# Patient Record
Sex: FEMALE | Race: WHITE | NOT HISPANIC OR LATINO
[De-identification: names, ages, dates, MRNs, and addresses within clinical notes are randomized per-mention and may not be internally consistent; named-entity substitution may affect disease eponyms.]

---

## 2017-08-16 ENCOUNTER — APPOINTMENT (OUTPATIENT)
Dept: SURGICAL ONCOLOGY | Facility: CLINIC | Age: 82
End: 2017-08-16

## 2017-11-08 ENCOUNTER — APPOINTMENT (OUTPATIENT)
Dept: SURGICAL ONCOLOGY | Facility: CLINIC | Age: 82
End: 2017-11-08

## 2019-02-05 ENCOUNTER — INPATIENT (INPATIENT)
Facility: HOSPITAL | Age: 84
LOS: 3 days | Discharge: NURSING, REHAB OR LONG-TERM CARE | End: 2019-02-09
Attending: STUDENT IN AN ORGANIZED HEALTH CARE EDUCATION/TRAINING PROGRAM | Admitting: STUDENT IN AN ORGANIZED HEALTH CARE EDUCATION/TRAINING PROGRAM
Payer: MEDICARE

## 2019-02-05 VITALS
DIASTOLIC BLOOD PRESSURE: 59 MMHG | HEART RATE: 60 BPM | TEMPERATURE: 98 F | OXYGEN SATURATION: 100 % | SYSTOLIC BLOOD PRESSURE: 165 MMHG | RESPIRATION RATE: 17 BRPM

## 2019-02-05 LAB
ALBUMIN SERPL ELPH-MCNC: 4.4 G/DL — SIGNIFICANT CHANGE UP (ref 3.3–5)
ALP SERPL-CCNC: 97 U/L — SIGNIFICANT CHANGE UP (ref 40–120)
ALT FLD-CCNC: 19 U/L — SIGNIFICANT CHANGE UP (ref 4–33)
ANION GAP SERPL CALC-SCNC: 13 MMO/L — SIGNIFICANT CHANGE UP (ref 7–14)
APTT BLD: 31 SEC — SIGNIFICANT CHANGE UP (ref 27.5–36.3)
AST SERPL-CCNC: 33 U/L — HIGH (ref 4–32)
BASOPHILS # BLD AUTO: 0.04 K/UL — SIGNIFICANT CHANGE UP (ref 0–0.2)
BASOPHILS NFR BLD AUTO: 0.3 % — SIGNIFICANT CHANGE UP (ref 0–2)
BILIRUB SERPL-MCNC: 0.5 MG/DL — SIGNIFICANT CHANGE UP (ref 0.2–1.2)
BUN SERPL-MCNC: 12 MG/DL — SIGNIFICANT CHANGE UP (ref 7–23)
CALCIUM SERPL-MCNC: 10 MG/DL — SIGNIFICANT CHANGE UP (ref 8.4–10.5)
CHLORIDE SERPL-SCNC: 104 MMOL/L — SIGNIFICANT CHANGE UP (ref 98–107)
CO2 SERPL-SCNC: 25 MMOL/L — SIGNIFICANT CHANGE UP (ref 22–31)
CREAT SERPL-MCNC: 0.65 MG/DL — SIGNIFICANT CHANGE UP (ref 0.5–1.3)
EOSINOPHIL # BLD AUTO: 0.01 K/UL — SIGNIFICANT CHANGE UP (ref 0–0.5)
EOSINOPHIL NFR BLD AUTO: 0.1 % — SIGNIFICANT CHANGE UP (ref 0–6)
GLUCOSE SERPL-MCNC: 111 MG/DL — HIGH (ref 70–99)
HCT VFR BLD CALC: 45.6 % — HIGH (ref 34.5–45)
HGB BLD-MCNC: 14.3 G/DL — SIGNIFICANT CHANGE UP (ref 11.5–15.5)
IMM GRANULOCYTES NFR BLD AUTO: 0.4 % — SIGNIFICANT CHANGE UP (ref 0–1.5)
INR BLD: 1.04 — SIGNIFICANT CHANGE UP (ref 0.88–1.17)
LYMPHOCYTES # BLD AUTO: 0.88 K/UL — LOW (ref 1–3.3)
LYMPHOCYTES # BLD AUTO: 6.3 % — LOW (ref 13–44)
MCHC RBC-ENTMCNC: 28.8 PG — SIGNIFICANT CHANGE UP (ref 27–34)
MCHC RBC-ENTMCNC: 31.4 % — LOW (ref 32–36)
MCV RBC AUTO: 91.8 FL — SIGNIFICANT CHANGE UP (ref 80–100)
MONOCYTES # BLD AUTO: 0.85 K/UL — SIGNIFICANT CHANGE UP (ref 0–0.9)
MONOCYTES NFR BLD AUTO: 6.1 % — SIGNIFICANT CHANGE UP (ref 2–14)
NEUTROPHILS # BLD AUTO: 12.1 K/UL — HIGH (ref 1.8–7.4)
NEUTROPHILS NFR BLD AUTO: 86.8 % — HIGH (ref 43–77)
NRBC # FLD: 0 K/UL — LOW (ref 25–125)
PLATELET # BLD AUTO: 292 K/UL — SIGNIFICANT CHANGE UP (ref 150–400)
PMV BLD: 10.2 FL — SIGNIFICANT CHANGE UP (ref 7–13)
POTASSIUM SERPL-MCNC: 4 MMOL/L — SIGNIFICANT CHANGE UP (ref 3.5–5.3)
POTASSIUM SERPL-SCNC: 4 MMOL/L — SIGNIFICANT CHANGE UP (ref 3.5–5.3)
PROT SERPL-MCNC: 7.3 G/DL — SIGNIFICANT CHANGE UP (ref 6–8.3)
PROTHROM AB SERPL-ACNC: 11.9 SEC — SIGNIFICANT CHANGE UP (ref 9.8–13.1)
RBC # BLD: 4.97 M/UL — SIGNIFICANT CHANGE UP (ref 3.8–5.2)
RBC # FLD: 13.2 % — SIGNIFICANT CHANGE UP (ref 10.3–14.5)
SODIUM SERPL-SCNC: 142 MMOL/L — SIGNIFICANT CHANGE UP (ref 135–145)
WBC # BLD: 13.93 K/UL — HIGH (ref 3.8–10.5)
WBC # FLD AUTO: 13.93 K/UL — HIGH (ref 3.8–10.5)

## 2019-02-05 PROCEDURE — 73502 X-RAY EXAM HIP UNI 2-3 VIEWS: CPT | Mod: 26,LT

## 2019-02-05 PROCEDURE — 71045 X-RAY EXAM CHEST 1 VIEW: CPT | Mod: 26

## 2019-02-05 PROCEDURE — 76376 3D RENDER W/INTRP POSTPROCES: CPT | Mod: 26

## 2019-02-05 PROCEDURE — 72192 CT PELVIS W/O DYE: CPT | Mod: 26

## 2019-02-05 NOTE — ED PROVIDER NOTE - CLINICAL SUMMARY MEDICAL DECISION MAKING FREE TEXT BOX
PGY1/MD Linda. 89 yo F with no PMH, s/p fall, p/w left hip pain. No abrasion, no need of tetanus shot. plan for hip xray, if negative to fracture, pt may bear her weight.

## 2019-02-05 NOTE — ED ADULT TRIAGE NOTE - CHIEF COMPLAINT QUOTE
pt fell after her foot cart got stuck going into trunk. complains of Left sided hip pain. no obvious deformity noted.

## 2019-02-05 NOTE — ED PROVIDER NOTE - MEDICAL DECISION MAKING DETAILS
PGY1/MD Linda. 88 yo F with no PMH, s/p fall, p/w left hip pain. No abrasion, no need of tetanus shot. plan for hip xray, if negative to fracture, pt may bear her weight.

## 2019-02-05 NOTE — ED ADULT NURSE REASSESSMENT NOTE - NS ED NURSE REASSESS COMMENT FT1
pt noted to be hypertensive endorses hip pain, MD Mora aware will medicate as per order will continue to monitor

## 2019-02-05 NOTE — ED ADULT NURSE NOTE - NSIMPLEMENTINTERV_GEN_ALL_ED
Implemented All Fall with Harm Risk Interventions:  [place] to call system. Call bell, personal items and telephone within reach. Instruct patient to call for assistance. Room bathroom lighting operational. Non-slip footwear when patient is off stretcher. Physically safe environment: no spills, clutter or unnecessary equipment. Stretcher in lowest position, wheels locked, appropriate side rails in place. Provide visual cue, wrist band, yellow gown, etc. Monitor gait and stability. Monitor for mental status changes and reorient to person, place, and time. Review medications for side effects contributing to fall risk. Reinforce activity limits and safety measures with patient and family. Provide visual clues: red socks.

## 2019-02-05 NOTE — ED PROVIDER NOTE - ATTENDING CONTRIBUTION TO CARE
I performed a face to face bedside interview with patient regarding history of present illness, review of symptoms and past medical history. I completed an independent physical exam.  I have discussed patient's plan of care.   I agree with note as stated above, having amended the EMR as needed to reflect my findings. I have discussed the assessment and plan of care.  This includes during the time I functioned as the attending physician for this patient.  Attending Contribution to Care: agree with plan of resident. pt p/w [facility] fall, complaining of left sided pain I performed a face to face bedside interview with patient regarding history of present illness, review of symptoms and past medical history. I completed an independent physical exam.  I have discussed patient's plan of care.   I agree with note as stated above, having amended the EMR as needed to reflect my findings. I have discussed the assessment and plan of care.  This includes during the time I functioned as the attending physician for this patient.  Attending Contribution to Care: agree with plan of resident. pt p/w [facility] fall, complaining of left sided pain. unable to ambulate due to pain. xray neg for fracture. ct pos for superior and inferior pubic rami fracture. will be admitted to pain control and PT eval. unable to d/c home.

## 2019-02-05 NOTE — ED PROVIDER NOTE - PHYSICAL EXAMINATION
PGY1/MD Linda.   No e/o skull fracture, intracranial bleed, dental trauma, cervical, thoracic, or vertebral fracture or subluxation, no suspicion of thoracic, abdominal, pelvic or extremity injury by exam.    VITALS: reviewed  GEN: No apparent distress, A & O x 4, not intoxicated, GCS [id number].  HEAD/EYES: NC/AT, PERRL, EOMI, no conjunctival pallor, no CSF discharge from ear, nose. No hemotympanum, Montes signs or raccoon eyes.  ENT: mucus membranes moist, oropharynx WNL, trachea midline, no JVD, neck is supple, no distracting injury.  RESP: lungs CTA with equal breath sounds bilaterally, chest wall nontender and atraumatic  CV: heart with reg rhythm S1, S2, no murmur; distal pulses intact and symmetric bilaterally  ABDOMEN/PELVIS: normoactive bowel sounds, soft, nondistended, nontender. No tenderness on pelvinc ring, pubic symphysis.  MSK: Left hip tenderness, no limited ROM of flexion, extension, internal rotation or adduction of the hip but significant limited ROM when abduction. no edema, no asymmetry or bruise. No cervical spine midline tenderness or deformities. The back is without midline or lateral tenderness or deformity. The neck/back is ranged painlessly.  SKIN: warm, dry, no rash, no bruising, no cyanosis. color appropriate for ethnicity  NEURO: alert, mentating appropriately, no facial asymmetry. gross sensation, motor, coordination are intact  PSYCH: Affect appropriate

## 2019-02-05 NOTE — ED PROVIDER NOTE - OBJECTIVE STATEMENT
PGY1/MD Linda. 88 yo F with PMH of anxiety, PSH of mastectomy for breat cancer, cholecystectomy, s/p fall. Onset 3p, pt was at a parking garage, tripped and fell on her left hip. Pt was moving her staffs into the trunk. Pt needed help to get in the car, wait for 2 hrs in the seat to recover from the left hip pain, then called ambulance from inside the car. Took tylenol in the ambulance, which was helpful. Pain is aggravated by moving the hip but alleviated by resting. Denies head trauma. Has not tried ambulation. No abrasion or bruise. No blood thinner.

## 2019-02-05 NOTE — ED PROVIDER NOTE - PROGRESS NOTE DETAILS
PGY1/MD Linda. offered motrin for extra pain medication, pt refused to take it because she does not have pain unless moving the leg.
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
## 2019-02-06 DIAGNOSIS — Z90.49 ACQUIRED ABSENCE OF OTHER SPECIFIED PARTS OF DIGESTIVE TRACT: Chronic | ICD-10-CM

## 2019-02-06 DIAGNOSIS — Z90.12 ACQUIRED ABSENCE OF LEFT BREAST AND NIPPLE: Chronic | ICD-10-CM

## 2019-02-06 DIAGNOSIS — F41.9 ANXIETY DISORDER, UNSPECIFIED: ICD-10-CM

## 2019-02-06 DIAGNOSIS — R03.0 ELEVATED BLOOD-PRESSURE READING, WITHOUT DIAGNOSIS OF HYPERTENSION: ICD-10-CM

## 2019-02-06 DIAGNOSIS — D72.829 ELEVATED WHITE BLOOD CELL COUNT, UNSPECIFIED: ICD-10-CM

## 2019-02-06 DIAGNOSIS — Z29.9 ENCOUNTER FOR PROPHYLACTIC MEASURES, UNSPECIFIED: ICD-10-CM

## 2019-02-06 DIAGNOSIS — Z98.890 OTHER SPECIFIED POSTPROCEDURAL STATES: Chronic | ICD-10-CM

## 2019-02-06 LAB
ALBUMIN SERPL ELPH-MCNC: 4.1 G/DL — SIGNIFICANT CHANGE UP (ref 3.3–5)
ALP SERPL-CCNC: 97 U/L — SIGNIFICANT CHANGE UP (ref 40–120)
ALT FLD-CCNC: 29 U/L — SIGNIFICANT CHANGE UP (ref 4–33)
ANION GAP SERPL CALC-SCNC: 14 MMO/L — SIGNIFICANT CHANGE UP (ref 7–14)
APPEARANCE UR: CLEAR — SIGNIFICANT CHANGE UP
APPEARANCE UR: CLEAR — SIGNIFICANT CHANGE UP
AST SERPL-CCNC: 56 U/L — HIGH (ref 4–32)
BILIRUB SERPL-MCNC: 0.9 MG/DL — SIGNIFICANT CHANGE UP (ref 0.2–1.2)
BILIRUB UR-MCNC: NEGATIVE — SIGNIFICANT CHANGE UP
BILIRUB UR-MCNC: NEGATIVE — SIGNIFICANT CHANGE UP
BLOOD UR QL VISUAL: NEGATIVE — SIGNIFICANT CHANGE UP
BLOOD UR QL VISUAL: NEGATIVE — SIGNIFICANT CHANGE UP
BUN SERPL-MCNC: 10 MG/DL — SIGNIFICANT CHANGE UP (ref 7–23)
CALCIUM SERPL-MCNC: 9.3 MG/DL — SIGNIFICANT CHANGE UP (ref 8.4–10.5)
CHLORIDE SERPL-SCNC: 105 MMOL/L — SIGNIFICANT CHANGE UP (ref 98–107)
CHOLEST SERPL-MCNC: 180 MG/DL — SIGNIFICANT CHANGE UP (ref 120–199)
CO2 SERPL-SCNC: 20 MMOL/L — LOW (ref 22–31)
COLOR SPEC: SIGNIFICANT CHANGE UP
COLOR SPEC: YELLOW — SIGNIFICANT CHANGE UP
CREAT SERPL-MCNC: 0.57 MG/DL — SIGNIFICANT CHANGE UP (ref 0.5–1.3)
FERRITIN SERPL-MCNC: 212.8 NG/ML — HIGH (ref 15–150)
FOLATE SERPL-MCNC: 12.4 NG/ML — SIGNIFICANT CHANGE UP (ref 4.7–20)
GLUCOSE SERPL-MCNC: 115 MG/DL — HIGH (ref 70–99)
GLUCOSE UR-MCNC: NEGATIVE — SIGNIFICANT CHANGE UP
GLUCOSE UR-MCNC: NEGATIVE — SIGNIFICANT CHANGE UP
HAV IGM SER-ACNC: NONREACTIVE — SIGNIFICANT CHANGE UP
HBA1C BLD-MCNC: 5.5 % — SIGNIFICANT CHANGE UP (ref 4–5.6)
HBV CORE IGM SER-ACNC: NONREACTIVE — SIGNIFICANT CHANGE UP
HBV SURFACE AG SER-ACNC: NONREACTIVE — SIGNIFICANT CHANGE UP
HCV AB S/CO SERPL IA: 0.3 S/CO — SIGNIFICANT CHANGE UP
HCV AB SERPL-IMP: SIGNIFICANT CHANGE UP
HDLC SERPL-MCNC: 74 MG/DL — HIGH (ref 45–65)
IRON SATN MFR SERPL: 303 UG/DL — SIGNIFICANT CHANGE UP (ref 140–530)
IRON SATN MFR SERPL: 44 UG/DL — SIGNIFICANT CHANGE UP (ref 30–160)
KETONES UR-MCNC: NEGATIVE — SIGNIFICANT CHANGE UP
KETONES UR-MCNC: SIGNIFICANT CHANGE UP
LEUKOCYTE ESTERASE UR-ACNC: HIGH
LEUKOCYTE ESTERASE UR-ACNC: NEGATIVE — SIGNIFICANT CHANGE UP
LIPID PNL WITH DIRECT LDL SERPL: 106 MG/DL — SIGNIFICANT CHANGE UP
MAGNESIUM SERPL-MCNC: 2.5 MG/DL — SIGNIFICANT CHANGE UP (ref 1.6–2.6)
NITRITE UR-MCNC: NEGATIVE — SIGNIFICANT CHANGE UP
NITRITE UR-MCNC: NEGATIVE — SIGNIFICANT CHANGE UP
PH UR: 7 — SIGNIFICANT CHANGE UP (ref 5–8)
PH UR: 7 — SIGNIFICANT CHANGE UP (ref 5–8)
PHOSPHATE SERPL-MCNC: 2.8 MG/DL — SIGNIFICANT CHANGE UP (ref 2.5–4.5)
POTASSIUM SERPL-MCNC: 4 MMOL/L — SIGNIFICANT CHANGE UP (ref 3.5–5.3)
POTASSIUM SERPL-SCNC: 4 MMOL/L — SIGNIFICANT CHANGE UP (ref 3.5–5.3)
PROT SERPL-MCNC: 7 G/DL — SIGNIFICANT CHANGE UP (ref 6–8.3)
PROT UR-MCNC: NEGATIVE — SIGNIFICANT CHANGE UP
PROT UR-MCNC: NEGATIVE — SIGNIFICANT CHANGE UP
RBC CASTS # UR COMP ASSIST: SIGNIFICANT CHANGE UP (ref 0–?)
SODIUM SERPL-SCNC: 139 MMOL/L — SIGNIFICANT CHANGE UP (ref 135–145)
SP GR SPEC: 1.01 — SIGNIFICANT CHANGE UP (ref 1–1.04)
SP GR SPEC: 1.01 — SIGNIFICANT CHANGE UP (ref 1–1.04)
T4 FREE SERPL-MCNC: 1.29 NG/DL — SIGNIFICANT CHANGE UP (ref 0.9–1.8)
TRIGL SERPL-MCNC: 65 MG/DL — SIGNIFICANT CHANGE UP (ref 10–149)
TSH SERPL-MCNC: 1.02 UIU/ML — SIGNIFICANT CHANGE UP (ref 0.27–4.2)
UIBC SERPL-MCNC: 258.8 UG/DL — SIGNIFICANT CHANGE UP (ref 110–370)
UROBILINOGEN FLD QL: NORMAL — SIGNIFICANT CHANGE UP
UROBILINOGEN FLD QL: NORMAL — SIGNIFICANT CHANGE UP
VIT B12 SERPL-MCNC: 257 PG/ML — SIGNIFICANT CHANGE UP (ref 200–900)
WBC UR QL: SIGNIFICANT CHANGE UP (ref 0–?)

## 2019-02-06 PROCEDURE — 99223 1ST HOSP IP/OBS HIGH 75: CPT

## 2019-02-06 PROCEDURE — 27197 CLSD TX PELVIC RING FX: CPT | Mod: LT

## 2019-02-06 PROCEDURE — 99232 SBSQ HOSP IP/OBS MODERATE 35: CPT | Mod: GC,25

## 2019-02-06 RX ORDER — ASCORBIC ACID 60 MG
500 TABLET,CHEWABLE ORAL DAILY
Qty: 0 | Refills: 0 | Status: DISCONTINUED | OUTPATIENT
Start: 2019-02-06 | End: 2019-02-09

## 2019-02-06 RX ORDER — PREGABALIN 225 MG/1
1000 CAPSULE ORAL DAILY
Qty: 0 | Refills: 0 | Status: DISCONTINUED | OUTPATIENT
Start: 2019-02-06 | End: 2019-02-09

## 2019-02-06 RX ORDER — HEPARIN SODIUM 5000 [USP'U]/ML
5000 INJECTION INTRAVENOUS; SUBCUTANEOUS EVERY 12 HOURS
Qty: 0 | Refills: 0 | Status: DISCONTINUED | OUTPATIENT
Start: 2019-02-06 | End: 2019-02-09

## 2019-02-06 RX ORDER — ACETAMINOPHEN 500 MG
650 TABLET ORAL ONCE
Qty: 0 | Refills: 0 | Status: COMPLETED | OUTPATIENT
Start: 2019-02-06 | End: 2019-02-06

## 2019-02-06 RX ORDER — ACETAMINOPHEN 500 MG
650 TABLET ORAL EVERY 6 HOURS
Qty: 0 | Refills: 0 | Status: DISCONTINUED | OUTPATIENT
Start: 2019-02-06 | End: 2019-02-07

## 2019-02-06 RX ORDER — ACETAMINOPHEN 500 MG
650 TABLET ORAL EVERY 8 HOURS
Qty: 0 | Refills: 0 | Status: DISCONTINUED | OUTPATIENT
Start: 2019-02-06 | End: 2019-02-06

## 2019-02-06 RX ORDER — ALPRAZOLAM 0.25 MG
0.12 TABLET ORAL
Qty: 0 | Refills: 0 | COMMUNITY

## 2019-02-06 RX ORDER — AMLODIPINE BESYLATE 2.5 MG/1
5 TABLET ORAL DAILY
Qty: 0 | Refills: 0 | Status: DISCONTINUED | OUTPATIENT
Start: 2019-02-06 | End: 2019-02-09

## 2019-02-06 RX ORDER — ALPRAZOLAM 0.25 MG
0.12 TABLET ORAL AT BEDTIME
Qty: 0 | Refills: 0 | Status: DISCONTINUED | OUTPATIENT
Start: 2019-02-06 | End: 2019-02-09

## 2019-02-06 RX ORDER — IBUPROFEN 200 MG
400 TABLET ORAL ONCE
Qty: 0 | Refills: 0 | Status: COMPLETED | OUTPATIENT
Start: 2019-02-06 | End: 2019-02-07

## 2019-02-06 RX ORDER — ALPRAZOLAM 0.25 MG
0.25 TABLET ORAL ONCE
Qty: 0 | Refills: 0 | Status: DISCONTINUED | OUTPATIENT
Start: 2019-02-06 | End: 2019-02-06

## 2019-02-06 RX ADMIN — Medication 650 MILLIGRAM(S): at 23:42

## 2019-02-06 RX ADMIN — Medication 1 TABLET(S): at 17:31

## 2019-02-06 RX ADMIN — Medication 650 MILLIGRAM(S): at 16:45

## 2019-02-06 RX ADMIN — Medication 500 MILLIGRAM(S): at 12:22

## 2019-02-06 RX ADMIN — HEPARIN SODIUM 5000 UNIT(S): 5000 INJECTION INTRAVENOUS; SUBCUTANEOUS at 17:31

## 2019-02-06 RX ADMIN — Medication 650 MILLIGRAM(S): at 09:56

## 2019-02-06 RX ADMIN — AMLODIPINE BESYLATE 5 MILLIGRAM(S): 2.5 TABLET ORAL at 18:45

## 2019-02-06 RX ADMIN — Medication 650 MILLIGRAM(S): at 16:15

## 2019-02-06 RX ADMIN — PREGABALIN 1000 MICROGRAM(S): 225 CAPSULE ORAL at 13:19

## 2019-02-06 RX ADMIN — Medication 650 MILLIGRAM(S): at 10:29

## 2019-02-06 RX ADMIN — Medication 650 MILLIGRAM(S): at 04:55

## 2019-02-06 RX ADMIN — Medication 0.25 MILLIGRAM(S): at 18:45

## 2019-02-06 RX ADMIN — Medication 650 MILLIGRAM(S): at 03:45

## 2019-02-06 RX ADMIN — HEPARIN SODIUM 5000 UNIT(S): 5000 INJECTION INTRAVENOUS; SUBCUTANEOUS at 07:09

## 2019-02-06 RX ADMIN — Medication 1 TABLET(S): at 07:07

## 2019-02-06 RX ADMIN — Medication 650 MILLIGRAM(S): at 00:26

## 2019-02-06 NOTE — H&P ADULT - PROBLEM SELECTOR PLAN 4
DVT Prophylaxis: SQ Heparin,   Fall/aspiration precaution,  Hep A,B,C  profile, Iron studies, Ferritin, Vit B12, Folate,

## 2019-02-06 NOTE — PHYSICAL THERAPY INITIAL EVALUATION ADULT - PERTINENT HX OF CURRENT PROBLEM, REHAB EVAL
Patient is an 89 year old female admitted to [facility] on 2/6 s/p mechanical fall onto left hip. PMH includes:  anxiety, PSH of left  mastectomy for breat cancer, Rt Breast Lumpectomy, s/p cholecystectomy, Macular degeneration. CT pelvis: Acute, mildly displaced fractures of the left superior and inferior pubic rami. Patient is an 89 year old female admitted to [facility] on 2/6 s/p mechanical fall onto left hip. PMH includes: anxiety, PSH of left mastectomy for breast cancer, Right Breast Lumpectomy, s/p cholecystectomy, Macular degeneration. CT pelvis: Acute, mildly displaced fractures of the left superior and inferior pubic rami.

## 2019-02-06 NOTE — PHYSICAL THERAPY INITIAL EVALUATION ADULT - IMPAIRMENTS CONTRIBUTING IMPAIRED BED MOBILITY, REHAB EVAL
decreased ROM/pain/impaired postural control/RN Jesusita ANGLIN aware of pain/impaired balance/decreased strength

## 2019-02-06 NOTE — PHYSICAL THERAPY INITIAL EVALUATION ADULT - IMPAIRED TRANSFERS: SIT/STAND, REHAB EVAL
decreased ROM/RN Jesusita ANGLIN aware of pain/pain/impaired postural control/impaired balance/decreased strength

## 2019-02-06 NOTE — H&P ADULT - PROBLEM SELECTOR PLAN 1
PRELIM CT Pelvis  Report: Acute minimally displaced fractures of the left inferior and superior pubic rami. No intervention as per ORTHO on call on the phone tonight,   PT consult, WBAT, Pain control, fall/aspiration precaution,   UA, Urine Culture, ECG,   Oscal + Vit D, Vit C,  F/U CBC, CMP

## 2019-02-06 NOTE — CONSULT NOTE ADULT - SUBJECTIVE AND OBJECTIVE BOX
89y Female community ambulator presents c/o L hip pain and inability to ambulate s/p mechanical fall in parking lot. Denies HS/LOC. Denies numbness/tingling. Denies fever/chills. Denies pain/injury elsewhere.     PAST MEDICAL & SURGICAL HISTORY:  Macular degeneration  Breast cancer in female  Anxiety  S/P cholecystectomy  S/P lumpectomy, right breast  S/P mastectomy, left    MEDICATIONS  (STANDING):  ascorbic acid 500 milliGRAM(s) Oral daily  calcium carbonate 1250 mG  + Vitamin D (OsCal 500 + D) 1 Tablet(s) Oral two times a day  heparin  Injectable 5000 Unit(s) SubCutaneous every 12 hours    Allergies    iodine (Anaphylaxis)  IV Contrast (Anaphylaxis)    Intolerances                              14.3   13.93 )-----------( 292      ( 05 Feb 2019 20:30 )             45.6     05 Feb 2019 20:30    142    |  104    |  12     ----------------------------<  111    4.0     |  25     |  0.65     Ca    10.0       05 Feb 2019 20:30    TPro  7.3    /  Alb  4.4    /  TBili  0.5    /  DBili  x      /  AST  33     /  ALT  19     /  AlkPhos  97     05 Feb 2019 20:30    PT/INR - ( 05 Feb 2019 20:30 )   PT: 11.9 SEC;   INR: 1.04          PTT - ( 05 Feb 2019 20:30 )  PTT:31.0 SEC  Vital Signs Last 24 Hrs  T(C): 36.6 (02-06-19 @ 02:33), Max: 37.1 (02-06-19 @ 01:54)  T(F): 97.9 (02-06-19 @ 02:33), Max: 98.7 (02-06-19 @ 01:54)  HR: 68 (02-06-19 @ 02:33) (60 - 68)  BP: 168/80 (02-06-19 @ 02:33) (165/59 - 192/91)  BP(mean): --  RR: 18 (02-06-19 @ 02:33) (17 - 18)  SpO2: 99% (02-06-19 @ 02:33) (95% - 100%)    Imaging:   CT Pelvis demonstates L superior and inferior pubic rami fractures. No e/o hip fx/dislocation    Physical Exam  Gen: NAD  LLE:   skin intact, difficulty with SLR, neg log roll, neg heel strike +ttp over pubic rami/symphysis  motor itnact ehl/fhl/ta/gs function, no calf ttp, dp/pt pulse intact, compartments soft    A/P: 89y Female with L superior and inferior pubic rami fractures  -Pain control  -WBAT/PT  -Outpt osteoporosis workup  -No orthopedic surgical intervention at this time  - FU with Dr. Spicer in 1-2 weeks after discharge. Call to schedule appointment [phone]

## 2019-02-06 NOTE — PHYSICAL THERAPY INITIAL EVALUATION ADULT - PATIENT PROFILE REVIEW, REHAB EVAL
PT orders received: ambulate as tolerated . Consult with RN Jesusita ANGLIN , pt OK to participate in PT evaluation./yes

## 2019-02-06 NOTE — PHYSICAL THERAPY INITIAL EVALUATION ADULT - ADDITIONAL COMMENTS
Patient reports she lives alone in an apartment, +elevator. Patient reports she was previously independent in all ADLs and did not use an assistive device for ambulation.    Patient was left sitting in chair, all lines/tubes intact and call goldstein within reach, ALEJANDRA lang

## 2019-02-06 NOTE — PROVIDER CONTACT NOTE (OTHER) - ASSESSMENT
pt /90 manually, HR 68 o2 98@ on RA. pt states she is anxious. pt denies pain, headache, dizziness, n/v.

## 2019-02-06 NOTE — H&P ADULT - HISTORY OF PRESENT ILLNESS
88 y/o Female   with PMH of anxiety, PSH of left  mastectomy for breat cancer, Rt Breast Lumpectomy, No Chemo, No RTX, s/p cholecystectomy, Macular degeneration, s/p fall. Onset 3 pm , pt was at a parking garage, tripped and fell on her left hip. Pt was moving her staffs into the trunk. Pt needed help to get in the car, wait for 2 hrs in the seat to recover from the left hip pain, then called ambulance from inside the car. Took Tylenol  in the ambulance, which was helpful. Pain is aggravated by moving the hip but alleviated by resting. Denies head trauma. Has not tried ambulation. No abrasion or bruise. On No blood thinner. pt awake, A+O x 3, No CP, No SOB, no Cough, no dysuria, NO HA, no dizziness, no abdominal pain, S/P CT Pelvis as below:     < from: CT Pelvis Bony Only No Cont (02.05.19 @ 22:21) >  INTERPRETATION:  Acute minimally displaced fractures of the left inferior   and superior pubic rami.     Some fluid stranding anterior to the bladder.    I  talked to ORTHO resident on call tonight on the phone, no need for surgical intervention, PT consult, WBAT, and pain control as per Ortho on the phone, No need for ORTHO Consult ass per ORTHO on call tonight, Elevated BP in the ER, NO HX of HTN as per pt, + Leukocytosis as per lab noted, No Family HX of FALL or Fracture as per pt,     Labs: Na 142, K+ 4.0, BUN 12, Creatinine 0.65, Glucose 111, AST 33, WBC 13.93, Hgb 14.3, Platelet 292, PT 11.9, INR 1.04, PTT 31.0 ,    Vitals: Tem 97.9, HR 66, /91, RR 18, 95% RA,

## 2019-02-06 NOTE — H&P ADULT - MUSCULOSKELETAL
details… detailed exam no calf tenderness/decreased ROM due to pain/no joint erythema/no joint warmth/no joint swelling

## 2019-02-06 NOTE — PHYSICAL THERAPY INITIAL EVALUATION ADULT - GAIT DEVIATIONS NOTED, PT EVAL
decreased мария/decreased velocity of limb motion/decreased weight-shifting ability/decreased step length/increased time in double stance/decreased stride length

## 2019-02-06 NOTE — H&P ADULT - ASSESSMENT
88 y/o Female   with PMH of anxiety, PSH of left  mastectomy for breat cancer, Rt Breast Lumpectomy, No Chemo, No RTX, s/p cholecystectomy, Macular degeneration, s/p fall. Onset 3 pm , pt was at a parking garage, tripped and fell on her left hip. Pt was moving her staffs into the trunk. Pt needed help to get in the car, wait for 2 hrs in the seat to recover from the left hip pain, then called ambulance from inside the car. Took Tylenol  in the ambulance, which was helpful. Pain is aggravated by moving the hip but alleviated by resting. Denies head trauma. Has not tried ambulation. No abrasion or bruise. On No blood thinner. pt awake, A+O x 3, No CP, No SOB, no Cough, no dysuria, NO HA, no dizziness, no abdominal pain, S/P CT Pelvis as below:     < from: CT Pelvis Bony Only No Cont (02.05.19 @ 22:21) >  INTERPRETATION:  Acute minimally displaced fractures of the left inferior   and superior pubic rami.     Some fluid stranding anterior to the bladder.    I  talked to ORTHO resident on call tonight on the phone, no need for surgical intervention, PT consult, WBAT, and pain control as per Ortho on the phone, No need for ORTHO Consult ass per ORTHO on call tonight, Elevated BP in the ER, NO HX of HTN as per pt, + Leukocytosis as per lab noted, No Family HX of FALL or Fracture as per pt,

## 2019-02-06 NOTE — H&P ADULT - REASON FOR ADMISSION
Left Hip pain, fall, Acute minimally displaced fractures of the left inferior and superior pubic rami as per CT (prelim Report),

## 2019-02-06 NOTE — CONSULT NOTE ADULT - ATTENDING COMMENTS
Briefly, 89y F community ambulator presents c/o L hip pain and inability to ambulate s/p mechanical fall. XR c/w sup/inf rami fx, and CT confirms injury pattern.     Physical Exam  Gen: NAD  LLE: skin intact, able to SLR, neg log roll, ttp over pubic rami/symphysis, no ttp elsewhere, +ehl/fhl/ta/gs function, no calf ttp, dp/pt pulse intact, compartments soft    Imaging:   CT Pelvis demonstates L superior and inferior pubic rami fractures. No e/o hip fx/dislocation    A/P: 89y F with L sup/inf rami fractures  - Pain control  - WBAT/PT  - No orthopedic surgical intervention indicated, stable fracture pattern  - f/u as outpatient in 2wks; [phone]  - Please call with any further questions/concerns

## 2019-02-06 NOTE — PHYSICAL THERAPY INITIAL EVALUATION ADULT - IMPAIRMENTS CONTRIBUTING TO GAIT DEVIATIONS, PT EVAL
ALEJANDRA ANGLIN aware of pain/impaired postural control/impaired balance/decreased strength/decreased ROM/pain

## 2019-02-07 ENCOUNTER — TRANSCRIPTION ENCOUNTER (OUTPATIENT)
Age: 84
End: 2019-02-07

## 2019-02-07 LAB
BACTERIA UR CULT: SIGNIFICANT CHANGE UP
HCT VFR BLD CALC: 43.6 % — SIGNIFICANT CHANGE UP (ref 34.5–45)
HGB BLD-MCNC: 13.7 G/DL — SIGNIFICANT CHANGE UP (ref 11.5–15.5)
MCHC RBC-ENTMCNC: 29.1 PG — SIGNIFICANT CHANGE UP (ref 27–34)
MCHC RBC-ENTMCNC: 31.4 % — LOW (ref 32–36)
MCV RBC AUTO: 92.8 FL — SIGNIFICANT CHANGE UP (ref 80–100)
NRBC # FLD: 0 K/UL — LOW (ref 25–125)
PLATELET # BLD AUTO: 267 K/UL — SIGNIFICANT CHANGE UP (ref 150–400)
PMV BLD: 10.1 FL — SIGNIFICANT CHANGE UP (ref 7–13)
RBC # BLD: 4.7 M/UL — SIGNIFICANT CHANGE UP (ref 3.8–5.2)
RBC # FLD: 13.4 % — SIGNIFICANT CHANGE UP (ref 10.3–14.5)
SPECIMEN SOURCE: SIGNIFICANT CHANGE UP
WBC # BLD: 7.43 K/UL — SIGNIFICANT CHANGE UP (ref 3.8–10.5)
WBC # FLD AUTO: 7.43 K/UL — SIGNIFICANT CHANGE UP (ref 3.8–10.5)

## 2019-02-07 PROCEDURE — 93010 ELECTROCARDIOGRAM REPORT: CPT

## 2019-02-07 RX ORDER — DOCUSATE SODIUM 100 MG
100 CAPSULE ORAL
Qty: 0 | Refills: 0 | Status: DISCONTINUED | OUTPATIENT
Start: 2019-02-07 | End: 2019-02-09

## 2019-02-07 RX ORDER — ACETAMINOPHEN 500 MG
2 TABLET ORAL
Qty: 0 | Refills: 0 | COMMUNITY
Start: 2019-02-07

## 2019-02-07 RX ORDER — PREGABALIN 225 MG/1
1 CAPSULE ORAL
Qty: 0 | Refills: 0 | COMMUNITY
Start: 2019-02-07

## 2019-02-07 RX ORDER — AMLODIPINE BESYLATE 2.5 MG/1
1 TABLET ORAL
Qty: 0 | Refills: 0 | COMMUNITY
Start: 2019-02-07

## 2019-02-07 RX ORDER — ACETAMINOPHEN 500 MG
650 TABLET ORAL EVERY 6 HOURS
Qty: 0 | Refills: 0 | Status: DISCONTINUED | OUTPATIENT
Start: 2019-02-07 | End: 2019-02-09

## 2019-02-07 RX ORDER — DOCUSATE SODIUM 100 MG
1 CAPSULE ORAL
Qty: 0 | Refills: 0 | COMMUNITY
Start: 2019-02-07

## 2019-02-07 RX ORDER — ASCORBIC ACID 60 MG
1 TABLET,CHEWABLE ORAL
Qty: 0 | Refills: 0 | COMMUNITY
Start: 2019-02-07

## 2019-02-07 RX ORDER — TRAMADOL HYDROCHLORIDE 50 MG/1
25 TABLET ORAL EVERY 8 HOURS
Qty: 0 | Refills: 0 | Status: DISCONTINUED | OUTPATIENT
Start: 2019-02-07 | End: 2019-02-09

## 2019-02-07 RX ADMIN — Medication 650 MILLIGRAM(S): at 22:25

## 2019-02-07 RX ADMIN — Medication 400 MILLIGRAM(S): at 05:56

## 2019-02-07 RX ADMIN — HEPARIN SODIUM 5000 UNIT(S): 5000 INJECTION INTRAVENOUS; SUBCUTANEOUS at 17:19

## 2019-02-07 RX ADMIN — Medication 650 MILLIGRAM(S): at 12:34

## 2019-02-07 RX ADMIN — Medication 650 MILLIGRAM(S): at 13:00

## 2019-02-07 RX ADMIN — PREGABALIN 1000 MICROGRAM(S): 225 CAPSULE ORAL at 12:33

## 2019-02-07 RX ADMIN — Medication 500 MILLIGRAM(S): at 12:33

## 2019-02-07 RX ADMIN — Medication 1 TABLET(S): at 05:55

## 2019-02-07 RX ADMIN — Medication 1 TABLET(S): at 17:19

## 2019-02-07 RX ADMIN — Medication 400 MILLIGRAM(S): at 06:45

## 2019-02-07 RX ADMIN — Medication 650 MILLIGRAM(S): at 23:10

## 2019-02-07 RX ADMIN — Medication 650 MILLIGRAM(S): at 00:30

## 2019-02-07 RX ADMIN — Medication 0.12 MILLIGRAM(S): at 22:27

## 2019-02-07 RX ADMIN — AMLODIPINE BESYLATE 5 MILLIGRAM(S): 2.5 TABLET ORAL at 17:19

## 2019-02-07 RX ADMIN — HEPARIN SODIUM 5000 UNIT(S): 5000 INJECTION INTRAVENOUS; SUBCUTANEOUS at 05:56

## 2019-02-07 NOTE — DISCHARGE NOTE ADULT - CARE PROVIDER_API CALL
Clyde Spicer)  Orthopedics  [address], [address]  [place] [address]  Phone: [phone]  Fax: [phone]  Follow Up Time:     Esperanza Vargas)  Internal Medicine  [address], [address]  [place] [address]  Phone: [phone]  Fax: [phone]  Follow Up Time:

## 2019-02-07 NOTE — DISCHARGE NOTE ADULT - CARE PLAN
Principal Discharge DX:	Fall, initial encounter  Goal:	Remain free from falls  Assessment and plan of treatment:	- CT Pelvis- Acute, mildly displaced fractures of the left superior and inferior pubic rami  - Ortho consulted- No orthopedic surgical intervention at this time  - PT -Rehab, Weight Bearing as tolerated, Pain control  - Follow up with your PCP as outpatient for osteoporosis workup. Continue taking Oscal + Vit D, Vit C,  - HgbA1c- 5.5%, Ferritin- 212.8, Iron studies-WNL  - Follow up with Dr Spicer in 1-2 weeks after discharge. Call [phone] to schedule appointment  Secondary Diagnosis:	Pubic ramus fracture  Goal:	pain control  Assessment and plan of treatment:	- CT Pelvis- Acute, mildly displaced fractures of the left superior and inferior pubic rami  - Ortho consulted- No orthopedic surgical intervention at this time  - PT -Rehab, Weight Bearing as tolerated, Pain control  - Follow up with your PCP as outpatient for osteoporosis workup. Continue taking Oscal + Vit D, Vit C,  - HgbA1c- 5.5%, Ferritin- 212.8, Iron studies-WNL  - Follow up with Dr Spicer in 1-2 weeks after discharge. Call [phone] to schedule appointment  Secondary Diagnosis:	Anxiety  Assessment and plan of treatment:	- Xanax as needed  - Follow safety / fall precautions with ambulation  Secondary Diagnosis:	Leukocytosis  Goal:	Resolved  Assessment and plan of treatment:	- urinalyses and urine culture negative  - CXR showed Clear lungs. No fever, no signs of infection  Secondary Diagnosis:	Elevated blood pressure reading  Assessment and plan of treatment:	- your blood pressure was elevated in the hospital  - your were started on Norvasc 5mg oral daily to control BP better  - follow low sodium diet and follow up with PCP as outpatient  Secondary Diagnosis:	Macular degeneration  Assessment and plan of treatment:	- Follow up with your Opthol as outpatient for further management Principal Discharge DX:	Fall, initial encounter  Goal:	Remain free from falls  Assessment and plan of treatment:	- CT Pelvis- Acute, mildly displaced fractures of the left superior and inferior pubic rami  - Ortho consulted- No orthopedic surgical intervention at this time  - PT -Rehab, Weight Bearing as tolerated, Pain control  - Follow up with your PCP as outpatient for osteoporosis workup. Continue taking Oscal + Vit D, Vit C,  - HgbA1c- 5.5%, Ferritin- 212.8, Iron studies-WNL  - Follow up with Dr Spicer in 1-2 weeks after discharge. Call [phone] to schedule appointment  Secondary Diagnosis:	Pubic ramus fracture  Goal:	pain control  Assessment and plan of treatment:	- CT Pelvis- Acute, mildly displaced fractures of the left superior and inferior pubic rami  - Ortho consulted- No orthopedic surgical intervention at this time  - PT -Rehab, Weight Bearing as tolerated, Pain control  - Follow up with your PCP as outpatient for osteoporosis workup. Continue taking Oscal + Vit D, Vit C,  - HgbA1c- 5.5%, Ferritin- 212.8, Iron studies-WNL  - Follow up with Dr Spicer in 1-2 weeks after discharge. Call [phone] to schedule appointment  Secondary Diagnosis:	Anxiety  Assessment and plan of treatment:	- Xanax as needed  - Follow safety / fall precautions with ambulation  Secondary Diagnosis:	Leukocytosis  Goal:	Resolved  Assessment and plan of treatment:	- urinalyses and urine culture negative  - CXR showed Clear lungs. No fever, no signs of infection  Secondary Diagnosis:	Elevated blood pressure reading  Assessment and plan of treatment:	- your blood pressure was elevated in the hospital  - your were started on Norvasc 5mg oral daily to control BP better  - follow low sodium diet and follow up with PCP as outpatient  Secondary Diagnosis:	Macular degeneration  Assessment and plan of treatment:	- Follow up with your Optho as outpatient for further management

## 2019-02-07 NOTE — DISCHARGE NOTE ADULT - CARE PROVIDERS DIRECT ADDRESSES
,[email or web address]

## 2019-02-07 NOTE — DISCHARGE NOTE ADULT - PLAN OF CARE
Remain free from falls - CT Pelvis- Acute, mildly displaced fractures of the left superior and inferior pubic rami  - Ortho consulted- No orthopedic surgical intervention at this time  - PT -Rehab, Weight Bearing as tolerated, Pain control  - Follow up with your PCP as outpatient for osteoporosis workup. Continue taking Oscal + Vit D, Vit C,  - HgbA1c- 5.5%, Ferritin- 212.8, Iron studies-WNL  - Follow up with Dr Spicer in 1-2 weeks after discharge. Call [phone] to schedule appointment pain control - CT Pelvis- Acute, mildly displaced fractures of the left superior and inferior pubic rami  - Ortho consulted- No orthopedic surgical intervention at this time  - PT -Rehab, Weight Bearing as tolerated, Pain control  - Follow up with your PCP as outpatient for osteoporosis workup. Continue taking Oscal + Vit D, Vit C,  - HgbA1c- 5.5%, Ferritin- 212.8, Iron studies-WNL  - Follow up with Dr Spicer in 1-2 weeks after discharge. Call [phone] to schedule appointment - Xanax as needed  - Follow safety / fall precautions with ambulation Resolved - urinalyses and urine culture negative  - CXR showed Clear lungs. No fever, no signs of infection - your blood pressure was elevated in the hospital  - your were started on Norvasc 5mg oral daily to control BP better  - follow low sodium diet and follow up with PCP as outpatient - Follow up with your Opthol as outpatient for further management - Follow up with your Optho as outpatient for further management

## 2019-02-07 NOTE — DISCHARGE NOTE ADULT - ADDITIONAL INSTRUCTIONS
Follow up with Dr Spicer in 1-2 weeks after discharge. Call [phone] to schedule appointment  Follow up with PCP as outpatient for further management

## 2019-02-07 NOTE — DISCHARGE NOTE ADULT - PATIENT PORTAL LINK FT
You can access the [facility] Patient Portal, offered by [facility], by registering with the following website: [email or web address]

## 2019-02-07 NOTE — DISCHARGE NOTE ADULT - HOSPITAL COURSE
89 y/o Female   with PMH of anxiety, PSH of left  mastectomy for breat cancer, Rt Breast Lumpectomy, No Chemo, No RTX, s/p cholecystectomy, Macular degeneration, s/p fall, pt was at a parking garage, tripped and fell on her left hip    Fall, initial encounter  - CT Pelvis- Acute, mildly displaced fractures of the left superior and inferior pubic rami. Ortho consulted- No orthopedic surgical intervention at this time. PT -Rehab, WBAT, Pain control. Out pt osteoporosis workup. Oscal + Vit D, Vit C, HgbA1c- 5.5%, Ferritin- 212.8, Iron studies-WNL. FU with Dr. Spicer in 1-2 weeks after discharge. Call [phone] to schedule appointment     Anxiety - on Xanax PRN. TSH-1.02, Free T4-1.29, Vit , Folate-12.4    Elevated blood pressure reading - monitor BP    Preventive measure- DVT Prophylaxis: SQ Heparin 89 y/o Female   with PMH of anxiety, PSH of left  mastectomy for breat cancer, Rt Breast Lumpectomy, No Chemo, No RTX, s/p cholecystectomy, Macular degeneration, s/p fall, pt was at a parking garage, tripped and fell on her left hip    Fall, initial encounter  - CT Pelvis- Acute, mildly displaced fractures of the left superior and inferior pubic rami. Ortho consulted- No orthopedic surgical intervention at this time. PT -Rehab, WBAT, Pain control. Out pt osteoporosis workup. Oscal + Vit D, Vit C, HgbA1c- 5.5%, Ferritin- 212.8, Iron studies-WNL. FU with Dr. Spicer in 1-2 weeks after discharge. Call [phone] to schedule appointment     Anxiety - on Xanax PRN. TSH-1.02, Free T4-1.29, Vit , Folate-12.4    Elevated blood pressure reading - monitor BP    Preventive measure- DVT Prophylaxis: SQ Heparin    Patient is medically cleared for f/c o rehab

## 2019-02-08 RX ORDER — TRAMADOL HYDROCHLORIDE 50 MG/1
0.5 TABLET ORAL
Qty: 0 | Refills: 0 | COMMUNITY
Start: 2019-02-08

## 2019-02-08 RX ADMIN — PREGABALIN 1000 MICROGRAM(S): 225 CAPSULE ORAL at 09:46

## 2019-02-08 RX ADMIN — Medication 650 MILLIGRAM(S): at 06:22

## 2019-02-08 RX ADMIN — Medication 500 MILLIGRAM(S): at 09:46

## 2019-02-08 RX ADMIN — HEPARIN SODIUM 5000 UNIT(S): 5000 INJECTION INTRAVENOUS; SUBCUTANEOUS at 17:23

## 2019-02-08 RX ADMIN — AMLODIPINE BESYLATE 5 MILLIGRAM(S): 2.5 TABLET ORAL at 06:20

## 2019-02-08 RX ADMIN — Medication 650 MILLIGRAM(S): at 13:48

## 2019-02-08 RX ADMIN — Medication 1 TABLET(S): at 17:23

## 2019-02-08 RX ADMIN — Medication 650 MILLIGRAM(S): at 06:50

## 2019-02-08 RX ADMIN — HEPARIN SODIUM 5000 UNIT(S): 5000 INJECTION INTRAVENOUS; SUBCUTANEOUS at 06:20

## 2019-02-08 RX ADMIN — Medication 0.12 MILLIGRAM(S): at 23:10

## 2019-02-08 RX ADMIN — Medication 1 TABLET(S): at 06:19

## 2019-02-08 RX ADMIN — Medication 650 MILLIGRAM(S): at 14:30

## 2019-02-08 NOTE — PROGRESS NOTE ADULT - ASSESSMENT
88 y/o Female   with PMH of anxiety, PSH of left  mastectomy for breat cancer, Rt Breast Lumpectomy, No Chemo, No RTX, s/p cholecystectomy, Macular degeneration, s/p fall with acute left pubic ramus fractures
89 y/o Female   with PMH of anxiety, PSH of left  mastectomy for breat cancer, Rt Breast Lumpectomy, No Chemo, No RTX, s/p cholecystectomy, Macular degeneration, s/p fall with acute left pubic ramus fractures

## 2019-02-08 NOTE — PROGRESS NOTE ADULT - REASON FOR ADMISSION
Left Hip pain, fall, Acute minimally displaced fractures of the left inferior and superior pubic rami as per CT (prelim Report),
Left Hip pain, fall, Acute minimally displaced fractures of the left inferior and superior pubic rami as per CT (prelim Report),

## 2019-02-08 NOTE — PROGRESS NOTE ADULT - SUBJECTIVE AND OBJECTIVE BOX
Patient is a 89y old  Female who presents with a chief complaint of Left Hip pain, fall, Acute minimally displaced fractures of the left inferior and superior pubic rami as per CT (prelim Report), (2019 03:44)      SUBJECTIVE / OVERNIGHT EVENTS:    Relief from ibuprofen last night for left hip pain    Vital Signs Last 24 Hrs  T(C): 36.6 (2019 05:52), Max: 36.8 (2019 01:28)  T(F): 97.8 (2019 05:52), Max: 98.3 (2019 01:28)  HR: 67 (2019 05:52) (58 - 78)  BP: 147/74 (2019 05:52) (130/58 - 180/90)  BP(mean): --  RR: 18 (2019 05:52) (17 - 18)  SpO2: 100% (2019 05:52) (96% - 100%)  I&O's Summary      GENERAL: NAD, AAOx3  HEAD:  Atraumatic, Normocephalic  EYES: EOMI, PERRLA, conjunctiva and sclera clear  NECK: Supple, No JVD, No LAD  CHEST/LUNG: Clear to auscultation bilaterally; No wheeze  HEART: Regular rate and rhythm; No murmurs, rubs, or gallops  ABDOMEN: Soft, Nontender, Nondistended; Bowel sounds present  EXTREMITIES:  2+ Peripheral Pulses, No clubbing, cyanosis, or edema; LLE ROM limited 2' pain  SKIN: No rashes or lesions  NEURO: nonfocal CN/motor/sensory/reflexes    LABS:                        13.7   7.43  )-----------( 267      ( 2019 06:00 )             43.6     02-06    139  |  105  |  10  ----------------------------<  115<H>  4.0   |  20<L>  |  0.57    Ca    9.3      2019 07:37  Phos  2.8     02-06  Mg     2.5     02-06    TPro  7.0  /  Alb  4.1  /  TBili  0.9  /  DBili  x   /  AST  56<H>  /  ALT  29  /  AlkPhos  97  02-06    PT/INR - ( 2019 20:30 )   PT: 11.9 SEC;   INR: 1.04          PTT - ( 2019 20:30 )  PTT:31.0 SEC  CAPILLARY BLOOD GLUCOSE            Urinalysis Basic - ( 2019 07:48 )    Color: LIGHT YELLOW / Appearance: CLEAR / S.013 / pH: 7.0  Gluc: NEGATIVE / Ketone: SMALL  / Bili: NEGATIVE / Urobili: NORMAL   Blood: NEGATIVE / Protein: NEGATIVE / Nitrite: NEGATIVE   Leuk Esterase: NEGATIVE / RBC: x / WBC x   Sq Epi: x / Non Sq Epi: x / Bacteria: x        RADIOLOGY & ADDITIONAL TESTS:    Imaging Personally Reviewed:  [x] YES  [ ] NO    Consultant(s) Notes Reviewed:  [x] YES  [ ] NO      MEDICATIONS  (STANDING):  amLODIPine   Tablet 5 milliGRAM(s) Oral daily  ascorbic acid 500 milliGRAM(s) Oral daily  calcium carbonate 1250 mG  + Vitamin D (OsCal 500 + D) 1 Tablet(s) Oral two times a day  cyanocobalamin 1000 MICROGram(s) Oral daily  heparin  Injectable 5000 Unit(s) SubCutaneous every 12 hours    MEDICATIONS  (PRN):  acetaminophen   Tablet .. 650 milliGRAM(s) Oral every 6 hours PRN Moderate Pain (4 - 6), Severe Pain (7 - 10)  ALPRAZolam 0.125 milliGRAM(s) Oral at bedtime PRN Anxiety      Care Discussed with Consultants/Other Providers [x] YES  [ ] NO    HEALTH ISSUES - PROBLEM Dx:  Leukocytosis: Leukocytosis  Preventive measure: Preventive measure  Elevated blood pressure reading: Elevated blood pressure reading  Anxiety: Anxiety  Fall, initial encounter: Fall, initial encounter
Lt hip persists  no acute SOB or CP    Vital Signs Last 24 Hrs  T(C): 36.6 (08 Feb 2019 06:15), Max: 36.9 (07 Feb 2019 13:24)  T(F): 97.8 (08 Feb 2019 06:15), Max: 98.4 (07 Feb 2019 13:24)  HR: 63 (08 Feb 2019 06:15) (63 - 80)  BP: 149/78 (08 Feb 2019 06:15) (141/77 - 161/85)  BP(mean): --  RR: 18 (08 Feb 2019 06:15) (18 - 18)  SpO2: 98% (08 Feb 2019 06:15) (97% - 99%)    GENERAL: NAD, AAOx3  HEAD:  Atraumatic, Normocephalic  EYES: EOMI, PERRLA, conjunctiva and sclera clear  NECK: Supple, No JVD, No LAD  CHEST/LUNG: Clear to auscultation bilaterally; No wheeze  HEART: Regular rate and rhythm; No murmurs, rubs, or gallops  ABDOMEN: Soft, Nontender, Nondistended; Bowel sounds present  EXTREMITIES:  2+ Peripheral Pulses, No clubbing, cyanosis, or edema; LLE ROM limited 2' pain  SKIN: No rashes or lesions  NEURO: nonfocal CN/motor/sensory/reflexes    LABS:                        13.7   7.43  )-----------( 267      ( 07 Feb 2019 06:00 )             43.6             CAPILLARY BLOOD GLUCOSE

## 2019-02-08 NOTE — PROGRESS NOTE ADULT - PROBLEM SELECTOR PLAN 1
Acute minimally displaced fractures of the left inferior and superior pubic rami. No intervention as per ORTHO on call on the phone   PT consult, WBAT, Pain control, fall/aspiration precaution,
Acute minimally displaced fractures of the left inferior and superior pubic rami. No intervention as per ORTHO on call on the phone   PT consult, WBAT, Pain control, fall/aspiration precautions

## 2019-02-08 NOTE — PROGRESS NOTE ADULT - PROBLEM SELECTOR PLAN 4
DVT Prophylaxis: SQ Heparin,   Fall/aspiration precaution,
DVT Prophylaxis: SQ Heparin,   Fall/aspiration precaution,

## 2019-02-09 VITALS
DIASTOLIC BLOOD PRESSURE: 82 MMHG | OXYGEN SATURATION: 100 % | SYSTOLIC BLOOD PRESSURE: 158 MMHG | RESPIRATION RATE: 18 BRPM | HEART RATE: 90 BPM | TEMPERATURE: 97 F

## 2019-02-09 PROBLEM — C50.919 MALIGNANT NEOPLASM OF UNSPECIFIED SITE OF UNSPECIFIED FEMALE BREAST: Chronic | Status: ACTIVE | Noted: 2019-02-06

## 2019-02-09 PROBLEM — F41.9 ANXIETY DISORDER, UNSPECIFIED: Chronic | Status: ACTIVE | Noted: 2019-02-05

## 2019-02-09 PROBLEM — H35.30 UNSPECIFIED MACULAR DEGENERATION: Chronic | Status: ACTIVE | Noted: 2019-02-06

## 2019-02-09 RX ADMIN — Medication 650 MILLIGRAM(S): at 01:40

## 2019-02-09 RX ADMIN — AMLODIPINE BESYLATE 5 MILLIGRAM(S): 2.5 TABLET ORAL at 06:11

## 2019-02-09 RX ADMIN — Medication 1 TABLET(S): at 06:12

## 2019-02-09 RX ADMIN — Medication 650 MILLIGRAM(S): at 08:51

## 2019-02-09 RX ADMIN — Medication 650 MILLIGRAM(S): at 07:51

## 2019-02-09 RX ADMIN — Medication 650 MILLIGRAM(S): at 00:40

## 2019-02-09 RX ADMIN — HEPARIN SODIUM 5000 UNIT(S): 5000 INJECTION INTRAVENOUS; SUBCUTANEOUS at 06:11

## 2019-02-09 NOTE — PROVIDER CONTACT NOTE (OTHER) - ACTION/TREATMENT ORDERED:
Provider aware. No actions to be taken now. Continue to monitor.
Give BP medication, continue to monitor
continue to monitor

## 2019-02-09 NOTE — PROVIDER CONTACT NOTE (OTHER) - ASSESSMENT
Patient AxOx4, asymptomatic at this time, asleep between care, no acute distress noted. Denies chest pain, SOB, N/V/dizziness, will continue to monitor

## 2019-02-09 NOTE — CHART NOTE - NSCHARTNOTEFT_GEN_A_CORE
Spoke with Dr. Arauz, patient remains medically cleared for d/c today to rehab. Transport set up for 11am.

## 2020-06-30 DIAGNOSIS — Z01.818 ENCOUNTER FOR OTHER PREPROCEDURAL EXAMINATION: ICD-10-CM

## 2020-07-04 ENCOUNTER — APPOINTMENT (OUTPATIENT)
Dept: DISASTER EMERGENCY | Facility: CLINIC | Age: 85
End: 2020-07-04

## 2020-07-05 LAB — SARS-COV-2 N GENE NPH QL NAA+PROBE: NOT DETECTED

## 2020-09-21 ENCOUNTER — APPOINTMENT (OUTPATIENT)
Dept: DISASTER EMERGENCY | Facility: CLINIC | Age: 85
End: 2020-09-21

## 2020-09-22 LAB — SARS-COV-2 N GENE NPH QL NAA+PROBE: NOT DETECTED

## 2021-04-01 ENCOUNTER — TRANSCRIPTION ENCOUNTER (OUTPATIENT)
Age: 86
End: 2021-04-01

## 2021-04-09 ENCOUNTER — APPOINTMENT (OUTPATIENT)
Dept: DISASTER EMERGENCY | Facility: OTHER | Age: 86
End: 2021-04-09
Payer: MEDICARE

## 2021-04-09 PROCEDURE — 0002A: CPT

## 2022-06-02 NOTE — H&P ADULT - EXTREMITIES
Medicare Annual Wellness Visit    Doug Seen is here for Medicare AWV (Pt is here for Medicare Wellness and to review labs. )    Assessment & Plan   Medicare annual wellness visit, subsequent  Need for prophylactic vaccination against Streptococcus pneumoniae (pneumococcus)  -     Pneumococcal Conjugate PCV20, PF (Prevnar 20)  Need for prophylactic vaccination and inoculation against varicella  -     zoster recombinant adjuvanted vaccine (SHINGRIX) 50 MCG/0.5ML SUSR injection;  Inject 0.5 mLs into the muscle once for 1 dose, Disp-0.5 mL, R-0Print  SSS (sick sinus syndrome) (HCC)  -     CBC  -     Comprehensive Metabolic Panel  -     Hemoglobin A1C  -     Lipid Panel  -     TSH  -     Urinalysis with Reflex to Culture  -     CT head without contrast; Future  Vascular dementia with behavioral disturbance (HCC)  -     CBC  -     Comprehensive Metabolic Panel  -     Hemoglobin A1C  -     Lipid Panel  -     TSH  -     Urinalysis with Reflex to Culture  -     CT head without contrast; Future  Stage 3 chronic kidney disease, unspecified whether stage 3a or 3b CKD (HCC)  -     CBC  -     Comprehensive Metabolic Panel  -     Hemoglobin A1C  -     Lipid Panel  -     TSH  -     Urinalysis with Reflex to Culture  -     CT head without contrast; Future  Chronic systolic (congestive) heart failure  -     CBC  -     Comprehensive Metabolic Panel  -     Hemoglobin A1C  -     Lipid Panel  -     TSH  -     Urinalysis with Reflex to Culture  -     CT head without contrast; Future      Recommendations for Preventive Services Due: see orders and patient instructions/AVS.  Recommended screening schedule for the next 5-10 years is provided to the patient in written form: see Patient Instructions/AVS.  On this date, 6/2/22, I have spent 43 minutes reviewing previous notes, test results and face to face with the patient discussing the diagnosis and importance of compliance with the treatment plan as well as documenting on the day of (!) Dressing,Grooming,Bathing,Toileting,Walking/Balance  In the past 7 days, did you need help from others to take care of any of the following: Laundry, housekeeping, banking/finances, shopping, telephone use, food preparation, transportation, or taking medications?: (!) Yes  Select all that apply: (!) Laundry,Housekeeping,Banking/Finances,Shopping,Telephone Use,Food Preparation,Transportation,Taking Medications    ADL Interventions:  · TBD          Objective   Vitals:    06/02/22 1338   BP: 124/75   Pulse: 59   Resp: 16   Temp: 98.3 °F (36.8 °C)   TempSrc: Temporal   SpO2: 98%   Weight: 155 lb (70.3 kg)   Height: 5' 9\" (1.753 m)      Body mass index is 22.89 kg/m². General Appearance: in no acute distress, alert and frail-appearing  Eyes: pupils equal, round, and reactive to light, extraocular eye movements intact, conjunctivae normal  Pulmonary/Chest: clear to auscultation bilaterally- no wheezes, rales or rhonchi, normal air movement, no respiratory distress  Cardiovascular: normal rate, normal S1 and S2, no gallops, intact distal pulses and no carotid bruits  Extremities: no cyanosis and no clubbing  Neurologic: no changes and chronic gait instability and chronic expressive aphasia.        Lab Results   Component Value Date    WBC 5.8 02/28/2022    HGB 12.6 02/28/2022    HCT 38.0 02/28/2022    MCV 93 02/28/2022    RDW 12.6 02/28/2022     02/28/2022    NEUTOPHILPCT 68 02/28/2022    LYMPHOPCT 17 02/28/2022    MONOPCT 11 02/28/2022    EOSRELPCT 3 02/28/2022    BASOPCT 1 02/28/2022    LYMPHSABS 1.0 02/28/2022    MONOSABS 0.7 02/28/2022    EOSABS 0.2 02/28/2022    BASOSABS 0.1 02/28/2022    IMMGRAN 0 02/28/2022    GRANULOCYTEABSOLUTECOUNT 0.0 02/28/2022       Lab Results   Component Value Date     02/28/2022    K 4.0 02/28/2022     02/28/2022    CO2 28 02/28/2022    ANIONGAP 4 08/30/2021    GLUCOSE 104 02/28/2022    BUN 27 02/28/2022    CREATININE 1.64 02/28/2022    GFRAA 49 11/24/2021 CALCIUM 9.6 02/28/2022    BILITOT 0.7 02/28/2022    ALT 10 02/28/2022    AST 14 02/28/2022    ALKPHOS 54 02/28/2022    PROT 6.6 02/28/2022    LABALBU 4.3 02/28/2022    GLOB 3.6 08/30/2021       Lab Results   Component Value Date    CHOL 148 08/25/2021    HDL 62 08/25/2021    TRIG 67 08/25/2021    LDLCALC 73 08/25/2021    VLDL 13 08/25/2021       Lab Results   Component Value Date    LABA1C 5.7 09/17/2020    LABA1C 5.6 12/04/2019       Lab Results   Component Value Date    TSH 1.710 08/25/2021    TSH 1.770 09/17/2020    TSH 2.470 05/22/2020       Lab Results   Component Value Date    PSA <0.1 08/25/2021    PSA <0.1 02/26/2020       Lab Results   Component Value Date    SPECGRAV 1.014 12/15/2020    PHUR 7.5 12/15/2020    COLORU YELLOW 12/15/2020    CLARITYU TURBID 12/15/2020    LEUKOCYTESUR Negative 12/15/2020    PROTEINU 30 12/15/2020    GLUCOSEU 100 12/15/2020    GLUCOSEU Negative 05/22/2020    KETUA Negative 12/15/2020    BLOODU SMALL 12/15/2020    BILIRUBINUR Negative 12/15/2020    UROBILINOGEN 1.0 05/22/2020    NITRU Negative 12/15/2020    LABMICR Comment 05/22/2020                Allergies   Allergen Reactions    Latex Rash    Lisinopril Other (See Comments)     Other reaction(s): Cough-Intolerance    Adhesive Tape      Other reaction(s): redness and rash    Benazepril Other (See Comments)    Hydrochlorothiazide Other (See Comments)     Hand redness and swelling.  Amiodarone Rash    Valsartan Other (See Comments)     lightheaded     Prior to Visit Medications    Medication Sig Taking? Authorizing Provider   coenzyme Q10 100 MG CAPS capsule Take 100 mg by mouth daily Yes Historical Provider, MD   acetaminophen (TYLENOL 8 HOUR) 650 MG extended release tablet take 1 Tablet by oral route   as needed swallowing whole with water. Do not break, crush, dissolve and/or chew.  Yes Historical Provider, MD   Simethicone (GAS-X EXTRA STRENGTH) 125 MG CAPS as needed Yes Historical Provider, MD   Melatonin 500 MCG TBDP detailed exam

## 2022-12-08 ENCOUNTER — NON-APPOINTMENT (OUTPATIENT)
Age: 87
End: 2022-12-08

## 2022-12-08 ENCOUNTER — APPOINTMENT (OUTPATIENT)
Dept: SPEECH THERAPY | Facility: CLINIC | Age: 87
End: 2022-12-08

## 2022-12-12 ENCOUNTER — APPOINTMENT (OUTPATIENT)
Dept: OTOLARYNGOLOGY | Facility: CLINIC | Age: 87
End: 2022-12-12

## 2022-12-12 ENCOUNTER — NON-APPOINTMENT (OUTPATIENT)
Age: 87
End: 2022-12-12

## 2022-12-12 VITALS
BODY MASS INDEX: 19.63 KG/M2 | SYSTOLIC BLOOD PRESSURE: 168 MMHG | HEART RATE: 71 BPM | WEIGHT: 100 LBS | DIASTOLIC BLOOD PRESSURE: 88 MMHG | TEMPERATURE: 96.5 F | HEIGHT: 60 IN

## 2022-12-12 DIAGNOSIS — J34.2 DEVIATED NASAL SEPTUM: ICD-10-CM

## 2022-12-12 DIAGNOSIS — H61.21 IMPACTED CERUMEN, RIGHT EAR: ICD-10-CM

## 2022-12-12 PROCEDURE — 69210 REMOVE IMPACTED EAR WAX UNI: CPT

## 2022-12-12 PROCEDURE — 31231 NASAL ENDOSCOPY DX: CPT

## 2022-12-12 PROCEDURE — 99204 OFFICE O/P NEW MOD 45 MIN: CPT | Mod: 25

## 2022-12-12 PROCEDURE — 92557 COMPREHENSIVE HEARING TEST: CPT

## 2022-12-12 PROCEDURE — 92567 TYMPANOMETRY: CPT

## 2022-12-12 RX ORDER — AMOXICILLIN 875 MG/1
875 TABLET, FILM COATED ORAL
Qty: 14 | Refills: 1 | Status: ACTIVE | COMMUNITY
Start: 2022-12-12 | End: 1900-01-01

## 2022-12-12 RX ORDER — SERTRALINE HYDROCHLORIDE 50 MG/1
50 TABLET, FILM COATED ORAL
Qty: 90 | Refills: 0 | Status: ACTIVE | COMMUNITY
Start: 2022-08-16

## 2022-12-12 RX ORDER — ALPRAZOLAM 0.25 MG/1
0.25 TABLET ORAL
Qty: 60 | Refills: 0 | Status: ACTIVE | COMMUNITY
Start: 2022-08-16

## 2022-12-12 NOTE — END OF VISIT
[FreeTextEntry3] : I, Dr. Bradford personally performed the evaluation and management (E/M) services including all necessary procedures, for this new patient. That E/M includes conducting the clinically appropriate initial history &/or exam, assessing all conditions, and establishing the plan of care. Today, my BEBA, Aurora Ravi, was here to observe &/or participate in the visit & follow plan of care established by me.\par \par \par \par

## 2022-12-12 NOTE — HISTORY OF PRESENT ILLNESS
[de-identified] : Patient is here today for an ear cleaning as she went to have a hearing aid appointment and was told her ears were clogged. She does not have any pain or pressure in the ears. She does not have any ringing in the ears but when she is in her bedroom watching TV she feels a hissing noise like steam heat that comes and goes. \par She gets dizziness and vertigo on occasion but she attributes this to other health issues.\par She admits that she has been missing things and her hearing has been progressively worsening bilaterally. SHe uses closed captions on the TV \par SHe did recently have dentures made about 6 months ago. She is having a hard time swallowing and chewing with them in.

## 2022-12-12 NOTE — CONSULT LETTER
[Dear  ___] : Dear  [unfilled], [Consult Letter:] : I had the pleasure of evaluating your patient, [unfilled]. [Please see my note below.] : Please see my note below. [Consult Closing:] : Thank you very much for allowing me to participate in the care of this patient.  If you have any questions, please do not hesitate to contact me. [Sincerely,] : Sincerely, [FreeTextEntry3] : Huan Bradford MD\par [facility]\par Otolaryngology and Facial Plastics\par [employer], [employer]\par

## 2022-12-12 NOTE — PHYSICAL EXAM
[Midline] : trachea located in midline position [Normal] : no rashes [Nasal Endoscopy Performed] : nasal endoscopy was performed, see procedure section for findings [] : septum deviated to the right [de-identified] : mild serous fluid behind the right TM

## 2022-12-12 NOTE — ASSESSMENT
[FreeTextEntry1] : With diminished hearing referred for evaluation on examination massive cerumen impaction curetted out revealing fluid behind her tympanic membrane on the right side left side is fine audiogram confirmed put her on amoxicillin and referred over to hearing and speech for hearing aid evaluation.

## 2022-12-12 NOTE — REASON FOR VISIT
[Initial Evaluation] : an initial evaluation for [FreeTextEntry2] : ear clogging and hearing changes

## 2023-01-09 ENCOUNTER — APPOINTMENT (OUTPATIENT)
Dept: OTOLARYNGOLOGY | Facility: CLINIC | Age: 88
End: 2023-01-09
Payer: MEDICARE

## 2023-01-09 VITALS
BODY MASS INDEX: 19.63 KG/M2 | DIASTOLIC BLOOD PRESSURE: 79 MMHG | SYSTOLIC BLOOD PRESSURE: 158 MMHG | TEMPERATURE: 97.8 F | HEART RATE: 71 BPM | HEIGHT: 60 IN | WEIGHT: 100 LBS

## 2023-01-09 PROCEDURE — 99213 OFFICE O/P EST LOW 20 MIN: CPT

## 2023-01-09 RX ORDER — FLUTICASONE PROPIONATE 50 UG/1
50 SPRAY, METERED NASAL DAILY
Qty: 3 | Refills: 3 | Status: ACTIVE | COMMUNITY
Start: 2023-01-09 | End: 1900-01-01

## 2023-01-09 NOTE — PHYSICAL EXAM
[Nasal Endoscopy Performed] : nasal endoscopy was performed, see procedure section for findings [] : septum deviated to the right [Midline] : trachea located in midline position [Normal] : no rashes [de-identified] : mild serous fluid behind the right TM

## 2023-01-09 NOTE — ASSESSMENT
[FreeTextEntry1] : Patient follows up wax is all done she still has fluid in her right ear she is not at all interested in having the tube placed at this time we gave her the option of using Flonase for a month and reevaluate her in 3 months if the fluid were to persist consideration for myringotomy will be given at that time she is also no rush to get hearing aids feels that she is hearing fine at that time if the fluid resolves we will refer her for hearing aid if the fluid persists a myringotomy will be considered and we will reassess her in 3 months.

## 2023-01-09 NOTE — HISTORY OF PRESENT ILLNESS
[de-identified] : Patient was here in December 12th and had fluid in the right ear. She does not have any pain in the ears or dizziness. She does not note that the right ear is worse than the left. SHe did the oral antibiotics after the last visit. She still feels that her hearing is muffled bilaterally. She does not have any nasal congestion or runny nose right now

## 2023-01-09 NOTE — HISTORY OF PRESENT ILLNESS
[de-identified] : Patient was here in December 12th and had fluid in the right ear. She does not have any pain in the ears or dizziness. She does not note that the right ear is worse than the left. SHe did the oral antibiotics after the last visit. She still feels that her hearing is muffled bilaterally. She does not have any nasal congestion or runny nose right now

## 2023-01-09 NOTE — END OF VISIT
[FreeTextEntry3] : I, Dr. Bradford personally performed the evaluation and management (E/M) services , including all procedures, for this established patient who presents today with (a) new problem(s)/exacerbation of (an) existing condition(s). That E/M includes conducting the clinically appropriate interval history &/or exam, assessing all new/exacerbated conditions, and establishing a new plan of care. Today, my BEBA, Aurora Ravi, was here to observe &/or participate in the visit & follow plan of care established by me.\par \par \par

## 2023-01-09 NOTE — PHYSICAL EXAM
[Nasal Endoscopy Performed] : nasal endoscopy was performed, see procedure section for findings [] : septum deviated to the right [Midline] : trachea located in midline position [Normal] : no rashes [de-identified] : mild serous fluid behind the right TM

## 2023-03-28 ENCOUNTER — APPOINTMENT (OUTPATIENT)
Dept: OTOLARYNGOLOGY | Facility: CLINIC | Age: 88
End: 2023-03-28
Payer: MEDICARE

## 2023-03-28 VITALS
DIASTOLIC BLOOD PRESSURE: 77 MMHG | WEIGHT: 100 LBS | BODY MASS INDEX: 19.63 KG/M2 | HEIGHT: 60 IN | TEMPERATURE: 97.2 F | SYSTOLIC BLOOD PRESSURE: 138 MMHG | HEART RATE: 71 BPM

## 2023-03-28 DIAGNOSIS — H90.3 SENSORINEURAL HEARING LOSS, BILATERAL: ICD-10-CM

## 2023-03-28 DIAGNOSIS — H90.A11 CONDUCTIVE HEARING LOSS, UNILATERAL, RIGHT EAR WITH RESTRICTED HEARING ON THE CONTRALATERAL SIDE: ICD-10-CM

## 2023-03-28 PROCEDURE — 99213 OFFICE O/P EST LOW 20 MIN: CPT

## 2023-03-28 NOTE — ASSESSMENT
[FreeTextEntry1] : Follows up starting to feel some ear popping in her right ear is a good sign will continue to be patient reevaluate her in a month at which time we will repeat her hearing test and evaluate her for possible hearing aids if the fluid were to totally resolve.  If not consideration for tube will be performed at that day.

## 2023-03-28 NOTE — HISTORY OF PRESENT ILLNESS
[de-identified] : Patient has been using the Flonase since the last visit and admits to hearing popping in the right ear. She does not have any pain in the right ear. \par She does note that the right ear hearing is diminished compared to the left since the last visit, no change. \par She does not have any recent drainage from the ears.

## 2023-03-28 NOTE — REVIEW OF SYSTEMS
[As Noted in HPI] : as noted in HPI [Hearing Loss] : hearing loss [Negative] : Heme/Lymph [de-identified] : right ear fluid as per last visit

## 2023-04-28 ENCOUNTER — APPOINTMENT (OUTPATIENT)
Dept: OTOLARYNGOLOGY | Facility: CLINIC | Age: 88
End: 2023-04-28
Payer: MEDICARE

## 2023-04-28 VITALS — BODY MASS INDEX: 20.03 KG/M2 | WEIGHT: 102 LBS | HEIGHT: 60 IN

## 2023-04-28 DIAGNOSIS — R13.19 OTHER DYSPHAGIA: ICD-10-CM

## 2023-04-28 DIAGNOSIS — R04.0 EPISTAXIS: ICD-10-CM

## 2023-04-28 DIAGNOSIS — H65.91 UNSPECIFIED NONSUPPURATIVE OTITIS MEDIA, RIGHT EAR: ICD-10-CM

## 2023-04-28 PROCEDURE — 31575 DIAGNOSTIC LARYNGOSCOPY: CPT

## 2023-04-28 PROCEDURE — 99214 OFFICE O/P EST MOD 30 MIN: CPT | Mod: 25

## 2023-04-28 NOTE — REASON FOR VISIT
[Subsequent Evaluation] : a subsequent evaluation for [FreeTextEntry2] : ear check and swallowing issues

## 2023-04-28 NOTE — ASSESSMENT
[FreeTextEntry1] : Follows up still has fluid in her right ear also having some difficulty swallowing she is not sure if it is that her dentures are not she will get back to us if she wants to have a tube placed in her right ear and she will get back to us if she wants to go ahead with a modified barium swallow all of her questions were answered she was here with her daughter all of the options were discussed in great detail and understood endoscopically she had recent nosebleed in the right nasal cavity some staining she was told not to use any nasal sprays.

## 2023-04-28 NOTE — REVIEW OF SYSTEMS
[Hearing Loss] : hearing loss [Negative] : Heme/Lymph [As Noted in HPI] : as noted in HPI [Nose Bleeds] : nose bleeds [de-identified] : ear popping  [de-identified] : swallowing issues

## 2023-04-28 NOTE — HISTORY OF PRESENT ILLNESS
[de-identified] : Patient is here today for a check of the ears. At the last visit she had fluid in the right ear. She does not have any pain in the ears but admits to popping of the ear and diminished hearing.\par She does have issues with the dentures staying in place, as well, but she does not feel this is related to her issues swallowing.  Her family with her today states that the issue she has with eating is that food gets stuck on the roof of the mouth and cant go down. She further describes a difficulty swallowing even with just water, feeling as if the throat closes and water wont go down easily. However, when she takes the dentures, she states that she can swallow without issue. She has not been back to her dentist in about a year, who gave her the dentures \par She had a nosebleed today as well. She has not used the flonase since prior to the last visit

## 2023-12-11 NOTE — PROVIDER CONTACT NOTE (OTHER) - RECOMMENDATIONS
Chronic, controlled. Latest blood pressure and vitals reviewed-     Temp:  [97.9 °F (36.6 °C)]   Pulse:  [53-93]   Resp:  [20]   BP: (115-152)/(59-96)   SpO2:  [91 %-100 %] .   Home meds for hypertension were reviewed and noted below.   Hypertension Medications               hydroCHLOROthiazide (HYDRODIURIL) 25 MG tablet Take 25 mg by mouth once daily.    isosorbide mononitrate (IMDUR) 30 MG 24 hr tablet Take 1 tablet (30 mg total) by mouth once daily.    metoprolol succinate (TOPROL-XL) 50 MG 24 hr tablet Take 50 mg by mouth.            While in the hospital, will manage blood pressure as follows; Continue home antihypertensive regimen. Hold HCTZ for now.    Will utilize p.r.n. blood pressure medication only if patient's blood pressure greater than 160/100 and she develops symptoms such as worsening chest pain or shortness of breath.   continue to monitor

## 2025-03-07 NOTE — PHYSICAL THERAPY INITIAL EVALUATION ADULT - RANGE OF MOTION EXAMINATION, REHAB EVAL
Initial (On Arrival) Left hip flexion 0-75 degrees, knee/ankle ROM WFL/Right LE ROM was WFL (within functional limits)/bilateral upper extremity ROM was WFL (within functional limits)